# Patient Record
Sex: FEMALE | Race: WHITE
[De-identification: names, ages, dates, MRNs, and addresses within clinical notes are randomized per-mention and may not be internally consistent; named-entity substitution may affect disease eponyms.]

---

## 2017-03-24 ENCOUNTER — HOSPITAL ENCOUNTER (EMERGENCY)
Dept: HOSPITAL 17 - NEPC | Age: 32
Discharge: HOME | End: 2017-03-24
Payer: COMMERCIAL

## 2017-03-24 VITALS
OXYGEN SATURATION: 95 % | HEART RATE: 71 BPM | RESPIRATION RATE: 16 BRPM | DIASTOLIC BLOOD PRESSURE: 80 MMHG | TEMPERATURE: 98.7 F | SYSTOLIC BLOOD PRESSURE: 141 MMHG

## 2017-03-24 VITALS
HEART RATE: 60 BPM | RESPIRATION RATE: 18 BRPM | OXYGEN SATURATION: 98 % | DIASTOLIC BLOOD PRESSURE: 66 MMHG | SYSTOLIC BLOOD PRESSURE: 110 MMHG

## 2017-03-24 VITALS — DIASTOLIC BLOOD PRESSURE: 68 MMHG | SYSTOLIC BLOOD PRESSURE: 114 MMHG

## 2017-03-24 VITALS — OXYGEN SATURATION: 99 % | RESPIRATION RATE: 16 BRPM

## 2017-03-24 VITALS — HEIGHT: 62 IN | WEIGHT: 154.32 LBS | BODY MASS INDEX: 28.4 KG/M2

## 2017-03-24 DIAGNOSIS — R11.2: ICD-10-CM

## 2017-03-24 DIAGNOSIS — N30.00: Primary | ICD-10-CM

## 2017-03-24 DIAGNOSIS — Z85.41: ICD-10-CM

## 2017-03-24 DIAGNOSIS — Z85.830: ICD-10-CM

## 2017-03-24 DIAGNOSIS — B96.20: ICD-10-CM

## 2017-03-24 LAB
ALP SERPL-CCNC: 55 U/L (ref 45–117)
ALT SERPL-CCNC: 20 U/L (ref 10–53)
ANION GAP SERPL CALC-SCNC: 5 MEQ/L (ref 5–15)
AST SERPL-CCNC: 11 U/L (ref 15–37)
BACTERIA #/AREA URNS HPF: (no result) /HPF
BASOPHILS # BLD AUTO: 0 TH/MM3 (ref 0–0.2)
BASOPHILS NFR BLD: 0.5 % (ref 0–2)
BILIRUB SERPL-MCNC: 0.2 MG/DL (ref 0.2–1)
BUN SERPL-MCNC: 5 MG/DL (ref 7–18)
CHLORIDE SERPL-SCNC: 106 MEQ/L (ref 98–107)
COLOR UR: YELLOW
COMMENT (UR): (no result)
CULTURE IF INDICATED: (no result)
EOSINOPHIL # BLD: 0 TH/MM3 (ref 0–0.4)
EOSINOPHIL NFR BLD: 0.5 % (ref 0–4)
ERYTHROCYTE [DISTWIDTH] IN BLOOD BY AUTOMATED COUNT: 14 % (ref 11.6–17.2)
GFR SERPLBLD BASED ON 1.73 SQ M-ARVRAT: 87 ML/MIN (ref 89–?)
GLUCOSE UR STRIP-MCNC: (no result) MG/DL
HCO3 BLD-SCNC: 30.8 MEQ/L (ref 21–32)
HCT VFR BLD CALC: 40.6 % (ref 35–46)
HEMO FLAGS: (no result)
HGB UR QL STRIP: (no result)
KETONES UR STRIP-MCNC: (no result) MG/DL
LYMPHOCYTES # BLD AUTO: 2.1 TH/MM3 (ref 1–4.8)
LYMPHOCYTES NFR BLD AUTO: 25.4 % (ref 9–44)
MCH RBC QN AUTO: 28.7 PG (ref 27–34)
MCHC RBC AUTO-ENTMCNC: 33 % (ref 32–36)
MCV RBC AUTO: 87.2 FL (ref 80–100)
MONOCYTES NFR BLD: 7.8 % (ref 0–8)
NEUTROPHILS # BLD AUTO: 5.4 TH/MM3 (ref 1.8–7.7)
NEUTROPHILS NFR BLD AUTO: 65.8 % (ref 16–70)
NITRITE UR QL STRIP: (no result)
PLATELET # BLD: 194 TH/MM3 (ref 150–450)
POTASSIUM SERPL-SCNC: 3.9 MEQ/L (ref 3.5–5.1)
RBC # BLD AUTO: 4.66 MIL/MM3 (ref 4–5.3)
SODIUM SERPL-SCNC: 142 MEQ/L (ref 136–145)
SP GR UR STRIP: 1.02 (ref 1–1.03)
SQUAMOUS #/AREA URNS HPF: <1 /HPF (ref 0–5)
WBC # BLD AUTO: 8.2 TH/MM3 (ref 4–11)

## 2017-03-24 PROCEDURE — 74020: CPT

## 2017-03-24 PROCEDURE — 84703 CHORIONIC GONADOTROPIN ASSAY: CPT

## 2017-03-24 PROCEDURE — 99284 EMERGENCY DEPT VISIT MOD MDM: CPT

## 2017-03-24 PROCEDURE — 87086 URINE CULTURE/COLONY COUNT: CPT

## 2017-03-24 PROCEDURE — 81001 URINALYSIS AUTO W/SCOPE: CPT

## 2017-03-24 PROCEDURE — 87186 SC STD MICRODIL/AGAR DIL: CPT

## 2017-03-24 PROCEDURE — 80053 COMPREHEN METABOLIC PANEL: CPT

## 2017-03-24 PROCEDURE — 96374 THER/PROPH/DIAG INJ IV PUSH: CPT

## 2017-03-24 PROCEDURE — 87077 CULTURE AEROBIC IDENTIFY: CPT

## 2017-03-24 PROCEDURE — 83690 ASSAY OF LIPASE: CPT

## 2017-03-24 PROCEDURE — 85025 COMPLETE CBC W/AUTO DIFF WBC: CPT

## 2017-03-24 PROCEDURE — 83605 ASSAY OF LACTIC ACID: CPT

## 2017-03-24 PROCEDURE — 96375 TX/PRO/DX INJ NEW DRUG ADDON: CPT

## 2017-03-24 NOTE — RADRPT
EXAM DATE/TIME:  2017 18:44 

 

[facility] COMPARISON:     

No previous studies available for comparison.

 

                     

INDICATIONS :     

Abdomen pain.

                     

 

MEDICAL HISTORY :     

None.          

 

SURGICAL HISTORY :     

Cholecystectomy.  section.  

 

ENCOUNTER:     

Initial                                        

 

ACUITY:     

2 days      

 

PAIN SCORE:     

9/10

 

LOCATION:     

Bilateral upper quadrant and lower quadrant.

 

FINDINGS:     

Supine and upright views of the abdomen were performed.  The surgical clips are seen from previous ch
olecystectomy. The abdominal bowel gas pattern is normal.  No air fluid levels are seen.  No abnormal
 masses, calcifications, or organomegaly is seen.  The visualized lower lungs are clear.  No evidence
 of free intraperitoneal gas.  The osseous structures are unremarkable.

 

CONCLUSION:     

Nonspecific, benign abdomen or pelvis.

 

 

 

 Caden Peterson MD on 2017 at 18:47           

Board Certified Radiologist.

 This report was verified electronically.
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
## 2017-12-06 ENCOUNTER — HOSPITAL ENCOUNTER (EMERGENCY)
Dept: HOSPITAL 17 - PHEFT | Age: 32
Discharge: HOME | End: 2017-12-06
Payer: COMMERCIAL

## 2017-12-06 VITALS
TEMPERATURE: 98.2 F | HEART RATE: 88 BPM | OXYGEN SATURATION: 98 % | DIASTOLIC BLOOD PRESSURE: 76 MMHG | RESPIRATION RATE: 18 BRPM | SYSTOLIC BLOOD PRESSURE: 138 MMHG
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
## 2017-12-06 DIAGNOSIS — M54.2: Primary | ICD-10-CM

## 2017-12-06 PROCEDURE — 99284 EMERGENCY DEPT VISIT MOD MDM: CPT

## 2017-12-06 PROCEDURE — 72125 CT NECK SPINE W/O DYE: CPT
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
## 2018-02-07 ENCOUNTER — HOSPITAL ENCOUNTER (EMERGENCY)
Dept: HOSPITAL 17 - PHED | Age: 33
Discharge: HOME | End: 2018-02-07
Payer: COMMERCIAL

## 2018-02-07 VITALS
SYSTOLIC BLOOD PRESSURE: 141 MMHG | HEART RATE: 101 BPM | DIASTOLIC BLOOD PRESSURE: 105 MMHG | OXYGEN SATURATION: 98 % | RESPIRATION RATE: 16 BRPM | TEMPERATURE: 98.6 F

## 2018-02-07 VITALS — BODY MASS INDEX: 32.74 KG/M2 | HEIGHT: 64 IN | WEIGHT: 191.8 LBS

## 2018-02-07 DIAGNOSIS — R09.89: ICD-10-CM

## 2018-02-07 DIAGNOSIS — Z88.0: ICD-10-CM

## 2018-02-07 DIAGNOSIS — Z85.41: ICD-10-CM

## 2018-02-07 DIAGNOSIS — Z85.830: ICD-10-CM

## 2018-02-07 DIAGNOSIS — N93.9: Primary | ICD-10-CM

## 2018-02-07 DIAGNOSIS — Z88.5: ICD-10-CM

## 2018-02-07 DIAGNOSIS — R05: ICD-10-CM

## 2018-02-07 LAB
BASOPHILS # BLD AUTO: 0 TH/MM3 (ref 0–0.2)
BASOPHILS NFR BLD: 0.8 % (ref 0–2)
EOSINOPHIL # BLD: 0.3 TH/MM3 (ref 0–0.4)
EOSINOPHIL NFR BLD: 4.7 % (ref 0–4)
ERYTHROCYTE [DISTWIDTH] IN BLOOD BY AUTOMATED COUNT: 13.5 % (ref 11.6–17.2)
HCT VFR BLD CALC: 39.1 % (ref 35–46)
HGB BLD-MCNC: 13.2 GM/DL (ref 11.6–15.3)
LYMPHOCYTES # BLD AUTO: 1.2 TH/MM3 (ref 1–4.8)
LYMPHOCYTES NFR BLD AUTO: 21.9 % (ref 9–44)
MCH RBC QN AUTO: 29 PG (ref 27–34)
MCHC RBC AUTO-ENTMCNC: 33.8 % (ref 32–36)
MCV RBC AUTO: 85.8 FL (ref 80–100)
MONOCYTE #: 0.5 TH/MM3 (ref 0–0.9)
MONOCYTES NFR BLD: 9.8 % (ref 0–8)
NEUTROPHILS # BLD AUTO: 3.4 TH/MM3 (ref 1.8–7.7)
NEUTROPHILS NFR BLD AUTO: 62.8 % (ref 16–70)
PLATELET # BLD: 169 TH/MM3 (ref 150–450)
PMV BLD AUTO: 8.4 FL (ref 7–11)
RBC # BLD AUTO: 4.56 MIL/MM3 (ref 4–5.3)
WBC # BLD AUTO: 5.4 TH/MM3 (ref 4–11)

## 2018-02-07 PROCEDURE — 85025 COMPLETE CBC W/AUTO DIFF WBC: CPT

## 2018-02-07 PROCEDURE — 99283 EMERGENCY DEPT VISIT LOW MDM: CPT

## 2018-02-07 PROCEDURE — 84702 CHORIONIC GONADOTROPIN TEST: CPT
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
## 2018-04-15 ENCOUNTER — HOSPITAL ENCOUNTER (EMERGENCY)
Dept: HOSPITAL 17 - PHED | Age: 33
LOS: 1 days | Discharge: HOME | End: 2018-04-16
Payer: MEDICAID

## 2018-04-15 VITALS
HEART RATE: 65 BPM | TEMPERATURE: 98.6 F | RESPIRATION RATE: 18 BRPM | DIASTOLIC BLOOD PRESSURE: 79 MMHG | OXYGEN SATURATION: 97 % | SYSTOLIC BLOOD PRESSURE: 133 MMHG

## 2018-04-15 VITALS — HEIGHT: 63 IN | BODY MASS INDEX: 34.57 KG/M2 | WEIGHT: 195.11 LBS

## 2018-04-15 VITALS
SYSTOLIC BLOOD PRESSURE: 124 MMHG | DIASTOLIC BLOOD PRESSURE: 73 MMHG | HEART RATE: 66 BPM | RESPIRATION RATE: 18 BRPM | OXYGEN SATURATION: 99 %

## 2018-04-15 VITALS — OXYGEN SATURATION: 98 %

## 2018-04-15 DIAGNOSIS — Z86.718: ICD-10-CM

## 2018-04-15 DIAGNOSIS — Z86.711: ICD-10-CM

## 2018-04-15 DIAGNOSIS — R07.89: Primary | ICD-10-CM

## 2018-04-15 DIAGNOSIS — Z88.0: ICD-10-CM

## 2018-04-15 DIAGNOSIS — M25.512: ICD-10-CM

## 2018-04-15 DIAGNOSIS — I49.9: ICD-10-CM

## 2018-04-15 PROCEDURE — 84484 ASSAY OF TROPONIN QUANT: CPT

## 2018-04-15 PROCEDURE — 71275 CT ANGIOGRAPHY CHEST: CPT

## 2018-04-15 PROCEDURE — 83735 ASSAY OF MAGNESIUM: CPT

## 2018-04-15 PROCEDURE — 99284 EMERGENCY DEPT VISIT MOD MDM: CPT

## 2018-04-15 PROCEDURE — 93005 ELECTROCARDIOGRAM TRACING: CPT

## 2018-04-15 PROCEDURE — 85730 THROMBOPLASTIN TIME PARTIAL: CPT

## 2018-04-15 PROCEDURE — 85025 COMPLETE CBC W/AUTO DIFF WBC: CPT

## 2018-04-15 PROCEDURE — 82550 ASSAY OF CK (CPK): CPT

## 2018-04-15 PROCEDURE — 96374 THER/PROPH/DIAG INJ IV PUSH: CPT

## 2018-04-15 PROCEDURE — 85610 PROTHROMBIN TIME: CPT

## 2018-04-15 PROCEDURE — 80048 BASIC METABOLIC PNL TOTAL CA: CPT

## 2018-04-15 PROCEDURE — 96361 HYDRATE IV INFUSION ADD-ON: CPT

## 2018-04-16 VITALS
HEART RATE: 61 BPM | DIASTOLIC BLOOD PRESSURE: 79 MMHG | OXYGEN SATURATION: 98 % | SYSTOLIC BLOOD PRESSURE: 123 MMHG | RESPIRATION RATE: 16 BRPM

## 2018-04-16 VITALS — SYSTOLIC BLOOD PRESSURE: 108 MMHG | DIASTOLIC BLOOD PRESSURE: 60 MMHG

## 2018-04-16 VITALS
OXYGEN SATURATION: 96 % | HEART RATE: 62 BPM | SYSTOLIC BLOOD PRESSURE: 102 MMHG | RESPIRATION RATE: 16 BRPM | DIASTOLIC BLOOD PRESSURE: 61 MMHG

## 2018-04-16 LAB
BASOPHILS # BLD AUTO: 0.1 TH/MM3 (ref 0–0.2)
BASOPHILS NFR BLD: 1.4 % (ref 0–2)
BUN SERPL-MCNC: 7 MG/DL (ref 7–18)
CALCIUM SERPL-MCNC: 9.2 MG/DL (ref 8.5–10.1)
CHLORIDE SERPL-SCNC: 106 MEQ/L (ref 98–107)
CREAT SERPL-MCNC: 0.57 MG/DL (ref 0.5–1)
EOSINOPHIL # BLD: 0.5 TH/MM3 (ref 0–0.4)
EOSINOPHIL NFR BLD: 6.4 % (ref 0–4)
ERYTHROCYTE [DISTWIDTH] IN BLOOD BY AUTOMATED COUNT: 14.1 % (ref 11.6–17.2)
GFR SERPLBLD BASED ON 1.73 SQ M-ARVRAT: 122 ML/MIN (ref 89–?)
GLUCOSE SERPL-MCNC: 91 MG/DL (ref 74–106)
HCO3 BLD-SCNC: 27.3 MEQ/L (ref 21–32)
HCT VFR BLD CALC: 41.7 % (ref 35–46)
HGB BLD-MCNC: 13.7 GM/DL (ref 11.6–15.3)
INR PPP: 1 RATIO
LYMPHOCYTES # BLD AUTO: 2.1 TH/MM3 (ref 1–4.8)
LYMPHOCYTES NFR BLD AUTO: 25.4 % (ref 9–44)
MAGNESIUM SERPL-MCNC: 2 MG/DL (ref 1.5–2.5)
MCH RBC QN AUTO: 28.4 PG (ref 27–34)
MCHC RBC AUTO-ENTMCNC: 33 % (ref 32–36)
MCV RBC AUTO: 86.2 FL (ref 80–100)
MONOCYTE #: 0.3 TH/MM3 (ref 0–0.9)
MONOCYTES NFR BLD: 4.2 % (ref 0–8)
NEUTROPHILS # BLD AUTO: 5.3 TH/MM3 (ref 1.8–7.7)
NEUTROPHILS NFR BLD AUTO: 62.6 % (ref 16–70)
PLATELET # BLD: 203 TH/MM3 (ref 150–450)
PMV BLD AUTO: 9 FL (ref 7–11)
PROTHROMBIN TIME: 10 SEC (ref 9.8–11.6)
RBC # BLD AUTO: 4.84 MIL/MM3 (ref 4–5.3)
SODIUM SERPL-SCNC: 140 MEQ/L (ref 136–145)
TROPONIN I SERPL-MCNC: (no result) NG/ML (ref 0.02–0.05)
WBC # BLD AUTO: 8.3 TH/MM3 (ref 4–11)

## 2018-04-16 NOTE — PD
HPI


Chief Complaint:  Chest Pain


Time Seen by Provider:  23:15


Travel History


International Travel<30 days:  No


Contact w/Intl Traveler<30days:  No


Traveled to known affect area:  No





History of Present Illness


HPI


33-year-old female arrives complaining of chest pain.  It started while she was 

wrestling with her children and significant other place weight.  She also has 

pain in the region of the left scapula.  The pain initially 3-4/10 and went 

away after brief period.  Then came back and was much more severe.  The patient 

took a bath and afterwards it felt better for a second or 2 and then again 

returned and was severe.  The patient reports pleuritic chest pain.  There is 

radiation of pain down the arm.  No cough or fever.  No similar prior episode.  

He called his chest pain as tightness.  Patient reports a history of DVT 

however has been unable follow up with primary care to establish 

anticoagulation regimen.





PFSH


Past Medical History


Cancer:  Yes (BONE CANCER-CHEMO IN )


Diminished Hearing:  No


Genitourinary:  Yes (CERVICAL CA HX)


Medical other:  Yes (PE)


Tetanus Vaccination:  < 5 Years


Influenza Vaccination:  No


Pregnant?:  Not Pregnant


LMP:  18


:  7


Para:  1


Miscarriage:  4


:  1





Past Surgical History


 Section:  Yes (X 1)


Cholecystectomy:  Yes


Gynecologic Surgery:  Yes (CERVICAL CA)


Other Surgery:  Yes (TUMOR REMOVED FROM LEFT SHOULDER IN 2007 - BONE CA)





Social History


Alcohol Use:  Yes (ocassionally)


Tobacco Use:  No


Substance Use:  No





Allergies-Medications


(Allergen,Severity, Reaction):  


Coded Allergies:  


     penicillin G (Unverified  Allergy, Severe, THROAT SWELLS, 4/15/18)


Reported Meds & Prescriptions





Reported Meds & Active Scripts


Active


No Active Prescriptions or Reported Medications    








Review of Systems


Except as stated in HPI:  all other systems reviewed are Neg


General / Constitutional:  No: Fever





Physical Exam


Narrative


GENERAL: 33-year-old female pleasant well-nourished well-developed





Vital Signs








  Date Time  Temp Pulse Resp B/P (MAP) Pulse Ox O2 Delivery O2 Flow Rate FiO2


 


18 00:07  61 16 123/79 (94) 98 Room Air  


 


4/15/18 23:58     98 Room Air  


 


4/15/18 23:58     98 Room Air  


 


4/15/18 23:12  66 18  99 Room Air  


 


4/15/18 23:12  66 18 124/73 (90) 99 Room Air  


 


4/15/18 21:30 98.6 65 18 133/79 (97) 97   








SKIN: Warm and dry.


HEAD: Atraumatic. Normocephalic. 


EYES: Pupils equal and round. No scleral icterus. No injection or drainage. 


ENT: No nasal bleeding or discharge.  Mucous membranes pink and moist.


NECK: Trachea midline. No JVD. 


CARDIOVASCULAR: Regular rate and rhythm.  Minimal tenderness to palpation 

overlying the paraspinal musculature just medial to the medial margin of the 

scapula on the left side.


RESPIRATORY: No accessory muscle use. Clear to auscultation. Breath sounds 

equal bilaterally. 


GASTROINTESTINAL: Abdomen soft, non-tender, nondistended. Hepatic and splenic 

margins not palpable. 


MUSCULOSKELETAL: Extremities without clubbing, cyanosis, or edema. No obvious 

deformities. 


NEUROLOGICAL: Awake and alert. No obvious cranial nerve deficits.  Motor 

grossly within normal limits. Five out of 5 muscle strength in the arms and 

legs.  Normal speech.


PSYCHIATRIC: Appropriate mood and affect; insight and judgment normal.





Data


Data


Last Documented VS





Vital Signs








  Date Time  Temp Pulse Resp B/P (MAP) Pulse Ox O2 Delivery O2 Flow Rate FiO2


 


18 00:59  62 16 102/61 (75) 96 Room Air  


 


4/15/18 21:30 98.6       








Orders





 Orders


Electrocardiogram (4/15/18 )


Electrocardiogram (4/15/18 23:30)


Basic Metabolic Panel (Bmp) (4/15/18 23:30)


Ckmb (Isoenzyme) Profile (4/15/18 23:30)


Complete Blood Count With Diff (4/15/18 23:30)


Magnesium (Mg) (4/15/18 23:30)


Prothrombin Time / Inr (Pt) (4/15/18 23:30)


Act Partial Throm Time (Ptt) (4/15/18 23:30)


Troponin I (4/15/18 23:30)


Ecg Monitoring (4/15/18 23:30)


Iv Access Insert/Monitor (4/15/18 23:30)


Oximetry (4/15/18 23:30)


Oxygen Administration (4/15/18 23:30)


Aspirin (Aspirin) (4/15/18 23:30)


Morphine Inj (Morphine Inj) (4/15/18 23:30)


Sodium Chlorid 0.9% 500 Ml Inj (Ns 500 M (4/15/18 23:30)


Acetamin-Hydrocod 325-5 Mg (Norco  5-325 (4/15/18 23:30)


Ct Pulmonary Angiogram (18 00:21)


Ed Discharge Order (18 01:08)


Iohexol 350 Inj (Omnipaque 350 Inj) (18 01:12)





Labs





Laboratory Tests








Test


  4/15/18


23:54


 


White Blood Count 8.3 TH/MM3 


 


Red Blood Count 4.84 MIL/MM3 


 


Hemoglobin 13.7 GM/DL 


 


Hematocrit 41.7 % 


 


Mean Corpuscular Volume 86.2 FL 


 


Mean Corpuscular Hemoglobin 28.4 PG 


 


Mean Corpuscular Hemoglobin


Concent 33.0 % 


 


 


Red Cell Distribution Width 14.1 % 


 


Platelet Count 203 TH/MM3 


 


Mean Platelet Volume 9.0 FL 


 


Neutrophils (%) (Auto) 62.6 % 


 


Lymphocytes (%) (Auto) 25.4 % 


 


Monocytes (%) (Auto) 4.2 % 


 


Eosinophils (%) (Auto) 6.4 % 


 


Basophils (%) (Auto) 1.4 % 


 


Neutrophils # (Auto) 5.3 TH/MM3 


 


Lymphocytes # (Auto) 2.1 TH/MM3 


 


Monocytes # (Auto) 0.3 TH/MM3 


 


Eosinophils # (Auto) 0.5 TH/MM3 


 


Basophils # (Auto) 0.1 TH/MM3 


 


CBC Comment DIFF FINAL 


 


Differential Comment  


 


Prothrombin Time 10.0 SEC 


 


Prothromb Time International


Ratio 1.0 RATIO 


 


 


Activated Partial


Thromboplast Time 26.4 SEC 


 


 


Blood Urea Nitrogen 7 MG/DL 


 


Creatinine 0.57 MG/DL 


 


Random Glucose 91 MG/DL 


 


Calcium Level 9.2 MG/DL 


 


Magnesium Level 2.0 MG/DL 


 


Sodium Level 140 MEQ/L 


 


Potassium Level 3.7 MEQ/L 


 


Chloride Level 106 MEQ/L 


 


Carbon Dioxide Level 27.3 MEQ/L 


 


Anion Gap 7 MEQ/L 


 


Estimat Glomerular Filtration


Rate 122 ML/MIN 


 


 


Total Creatine Kinase 95 U/L 


 


Troponin I


  LESS THAN 0.02


NG/ML











MDM


Medical Decision Making


Medical Screen Exam Complete:  Yes


Emergency Medical Condition:  Yes


Medical Record Reviewed:  Yes


Differential Diagnosis


NSTEMI, unstable angina, coronary vasospasm, PE, PTX, aortic dissection, 

pericarditis, myocarditis, endocarditis, PNA, esophageal disease, aneurysm, 

musculoskeletal etiologies, anxiety, cocaine/sympathomimetic abuse


Narrative Course


CBC & BMP Diagram


4/15/18 23:54








Calcium Level 9.2, Magnesium Level 2.0





Tn < 0.02


EKG shows a sinus rhythm with a rate of 67, nonspecific ST changes noted in 

nonspecific leads 





Last Impressions








CT Angiography 18 0021 Signed





Impressions: 





 Service Date/Time:  2018 00:35 - CONCLUSION:  No pulmonary 





 embolus or other acute abnormality.      Caden Scruggs MD 





The patient is resting comfortably and feels better, is alert and in no 

distress. The patients results and examination findings were discussed.  The 

repeat examination is unremarkable and benign. The history, exam, diagnostic 

testing, and current condition do not suggest any significant pathology to 

warrant further testing, continued ED treatment, admission, or surgical 

evaluation at this point. The vital signs have been stable. The patient does 

not have uncontrollable pain, intractable vomiting, or other significant 

symptoms. The patient's condition is stable and appropriate for discharge. The 

patient will pursue further outpatient evaluation with a primary care physician 

or other designated or consulting physician as indicated in the discharge 

instructions. The patient expressed understanding and was agreeable with this 

plan.





Diagnosis





 Primary Impression:  


 Chest pain


 Qualified Codes:  R07.9 - Chest pain, unspecified


 Additional Impression:  


 Pain of left scapula


***Med/Other Pt SpecificInfo:  Other


Scripts


No Active Prescriptions or Reported Meds


Disposition:  01 DISCHARGE HOME


Condition:  Stable











Jorge Lora MD 2018 00:14

## 2018-04-16 NOTE — RADRPT
EXAM DATE/TIME:  04/16/2018 00:35 

 

[facility] COMPARISON:     

No previous studies available for comparison.

 

 

INDICATIONS :     

Left chest pain.

                      

 

IV CONTRAST:     

75 cc Omnipaque 350 (iohexol) IV 

 

 

RADIATION DOSE:     

19.60 CTDIvol (mGy) 

 

 

MEDICAL HISTORY :     

Carcinoma, bone.  

 

SURGICAL HISTORY :      

None. 

 

ENCOUNTER:      

Initial

 

ACUITY:      

3 days

 

PAIN SCALE:      

8/10

 

LOCATION:       

Left chest 

 

TECHNIQUE:     

Volumetric scanning of the chest was performed using a pulmonary embolism protocol MIP images were re
constructed.  Using automated exposure control and adjustment of the mA and/or kV according to patien
t size, radiation dose was kept as low as reasonably achievable to obtain optimal diagnostic quality 
images.   DICOM format image data is available electronically for review and comparison.  

 

Follow-up recommendations for detected pulmonary nodules are based at a minimum on nodule size and pa
tient risk factors according to Fleischner Society Guidelines.

 

FINDINGS:     

 

PULMONARY ARTERIES:

No filling defects are seen in the pulmonary arteries through the segmental level.

 

LUNGS:     

There is no consolidation or pneumothorax .  No concerning pulmonary nodule is visualized.

 

PLEURAE:     

There is no pleural thickening or pleural effusion.

 

MEDIASTINUM:     

There is good visualization of the great vessels of the middle mediastinum.  No evidence of mediastin
al or hilar adenopathy/mass.

 

MUSCULOSKELETAL:     

Within normal limits for patient age.

 

MISCELLANEOUS:     

The visualized upper abdominal organs demonstrate no acute abnormality.

 

CONCLUSION:     

No pulmonary embolus or other acute abnormality.

 

 

 

 

 Caden Scruggs MD on April 16, 2018 at 1:02           

Board Certified Radiologist.

 This report was verified electronically.

## 2018-04-16 NOTE — EKG
Date Performed: 04/15/2018       Time Performed: 21:27:47

 

PTAGE:      33 years

 

EKG:      Sinus rhythm 

 

 WITH MARKED SINUS ARRHYTHMIA BORDERLINE ECG Since the 

 

 PREVIOUS TRACING            , no significant change noted PREVIOUS TRACIN2008 23.51

 

DOCTOR:   Angelito Samano  Interpretating Date/Time  2018 10:30:19